# Patient Record
Sex: FEMALE | Race: WHITE
[De-identification: names, ages, dates, MRNs, and addresses within clinical notes are randomized per-mention and may not be internally consistent; named-entity substitution may affect disease eponyms.]

---

## 2017-03-31 ENCOUNTER — HOSPITAL ENCOUNTER (EMERGENCY)
Dept: HOSPITAL 52 - LL.ED | Age: 77
Discharge: HOME | End: 2017-03-31
Payer: MEDICARE

## 2017-03-31 VITALS — DIASTOLIC BLOOD PRESSURE: 64 MMHG | SYSTOLIC BLOOD PRESSURE: 110 MMHG

## 2017-03-31 DIAGNOSIS — J45.909: ICD-10-CM

## 2017-03-31 DIAGNOSIS — Z79.01: ICD-10-CM

## 2017-03-31 DIAGNOSIS — I48.91: ICD-10-CM

## 2017-03-31 DIAGNOSIS — I11.0: ICD-10-CM

## 2017-03-31 DIAGNOSIS — Z79.899: ICD-10-CM

## 2017-03-31 DIAGNOSIS — M19.90: ICD-10-CM

## 2017-03-31 DIAGNOSIS — J44.9: ICD-10-CM

## 2017-03-31 DIAGNOSIS — Z98.890: ICD-10-CM

## 2017-03-31 DIAGNOSIS — E78.00: ICD-10-CM

## 2017-03-31 DIAGNOSIS — Z98.51: ICD-10-CM

## 2017-03-31 DIAGNOSIS — F41.9: ICD-10-CM

## 2017-03-31 DIAGNOSIS — Z88.8: ICD-10-CM

## 2017-03-31 DIAGNOSIS — Z86.73: ICD-10-CM

## 2017-03-31 DIAGNOSIS — R01.1: ICD-10-CM

## 2017-03-31 DIAGNOSIS — I42.9: ICD-10-CM

## 2017-03-31 DIAGNOSIS — S01.411A: Primary | ICD-10-CM
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
## 2017-03-31 DIAGNOSIS — I50.9: ICD-10-CM

## 2017-03-31 NOTE — EDM.PDOC
ED HPI HEAD INJURY





- General


Chief Complaint: Head Injury


Stated Complaint: fall, head injury


Time Seen by Provider: 17 11:56


Source of Information: Reports: Patient


History Limitations: Reports: No limitations





- History of Present Illness


INITIAL COMMENTS - FREE TEXT/NARRATIVE: 





Patient came in for evaluation of skin injuries to right side of head (cheek 

and around eyebrow) after falling over when petting dog.  Dog made her lose 

balance. No LOC. Broke lens of glasses on right side (popped it out of the frame

). Had mild headache initially but that improved. Had mild right shoulder 

discomfort also, improved. Complains of mild abrasions to knees.  Patient is 

concerned about the facial skin injuries and wants to know if she needs 

stitches. Tetanus UTD. No other complaints. No vision changes. No neuro 

changes. No SOB. No neck pain. Denies any other musculoskeletal pain.  No new 

back pain/abdominal pain. 





Says she has somewhat poor balance in general. 





INR yesterday subtheraputic at 1.8


Coumadin dose adjusted by primary provider yesterday. 





- Related Data


Allergies/ADRs: 


 Allergies











Allergy/AdvReac Type Severity Reaction Status Date / Time


 


aripiprazole [From Abilify] Allergy  Hallucinati Verified 16 01:04





   ons  


 


atorvastatin calcium Allergy  Muscle Verified 16 01:04





[From Lipitor]   Weakness  


 


ceftriaxone sodium Allergy  Hives Verified 16 01:04





[From Rocephin]     


 


fluticasone propionate Allergy  Cannot Verified 16 01:04





[From Advair Diskus]   Remember  


 


lansoprazole [From Prevacid] Allergy  Nausea Verified 16 01:04


 


loratadine Allergy  Headache,na Verified 16 01:04





[From Claritin-D 12 Hour]   usea  


 


metoprolol Allergy  Hallucinati Verified 16 01:04





   ons,hyperac  





   tivity  


 


nebivolol [From Bystolic] Allergy  Hypotension Verified 17 11:36


 


omeprazole [From Prilosec] Allergy  Nausea, Verified 16 01:04





   stomach  





   upset  


 


omeprazole magnesium Allergy  Nausea, Verified 16 01:04





[From Prilosec]   stomach  





   upset  


 


paroxetine HCl [From Paxil] Allergy  Stomach Verified 16 01:04





   Upset,  





   nausea,  





   weight gain  


 


pseudoephedrine sulfate Allergy  headache, Verified 16 01:04





[From Claritin-D 12 Hour]   nausea  


 


salmeterol xinafoate Allergy  Cannot Verified 16 01:04





[From Advair Diskus]   Remember  


 


sertraline HCl [From Zoloft] Allergy  suicidial Verified 16 01:04





   thoughts  


 


venlafaxine HCl Allergy  Cannot Verified 16 01:04





[From Effexor]   Remember  











Home Meds: 


 Home Meds





Cholecalciferol (Vitamin D3) [D3-2000] 2,000 unit PO DAILY@1000 14 [

History]


Ubidecarenone [Coq-10] 100 mg PO DAILY@1000 14 [History]


Albuterol [Ventolin HFA] 2 puff INH Q4H PRN 14 [History]


Beclomethasone Dipropionate [Qvar 40 Mcg] 2 puff INH BID 14 [History]


Ipratropium/Albuterol Sulfate [Duoneb 0.5 MG-3 MG/3 ML] 3 ml INH QID PRN  [History]


Polyethylene Glycol 3350 [MiraLAX] 17 gm PO DAILY PRN 14 [History]


Warfarin [Coumadin] 7.5 mg PO DAILY 14 [History]


Vitamin B Complex with C [Super B With Vitamin C] 50 mg PO DAILY 10/12/14 [

History]


Brimonidine [Alphagan P 0.1% Ophth Soln] 1 drop EYEBOTH BID 07/17/15 [History]


Ezetimibe [Zetia] 10 mg PO BEDTIME 11/14/15 [History]


L.acidoph,Paracasei, B.lactis [Probiotic] 1 cap PO DAILY 11/14/15 [History]


Fluticasone Propionate [Flonase] 1 spray NASBOTH DAILY PRN 16 [History]


Citalopram [Celexa] 20 mg PO BEDTIME 16 [History]


Furosemide [Lasix] 20 mg PO DAILY #10 tablet 16 [Rx]


Potassium Chloride [Klor-Con M20] 10 meq PO DAILY 16 [History]


Famotidine [Pepcid] 20 mg PO BID PRN 17 [History]


Isosorbide Mononitrate [Imdur] 30 mg PO DAILY 17 [History]


Nitroglycerin [Nitrostat] 0.4 mg SL ASDIRECTED 17 [History]


Ofloxacin [Floxin 0.3% Otic Soln] 1 drop EYEBOTH BID 17 [History]











Past Medical History


HEENT History: Reports: Allergic rhinitis, Cataract, Glaucoma, Impaired vision, 

Other (see below)


Other HEENT History: Wears glasses, beginning cataract of the left eye


Cardiovascular History: Reports: Afib, Arrhythmia, Blood clots/VTE/DVT, 

Cardiomyopathy, Heart Failure, Heart murmur, High cholesterol, Hypertension, 

Syncope, Other (see below)


Other Cardiovascular History: PACs, PVCs Holter monitor study, mild diffuse 

valvular disease by echocardiogram as below including aortic valve stenosis and 

mitral valve insufficiency by clinical exam, near syncopal episodes likely 

secondary to bradycardia with previous episode on 16, dyslipidemia with 

history of fatty liver, bilateral PEs as below, grade 1 diastolic dysfunction 

by echocardiogram


Respiratory History: Reports: Asthma, Bronchitis, recurrent, COPD, Intubation, 

previous, PE, Pulmonary fibrosis, Other (see below)


Other Respiratory History: Bilateral PEs on 2014, mediastinal 

lymphadenopathy by CT scan with benign right-sided pulmonary granuloma biopsy 

and serial CT scans


Gastrointestinal History: Reports: Chronic constipation, Colon polyp, 

Diverticulosis, Gastritis, GERD, Helicobacter pylori, Hiatal hernia, 

Inflammatory bowel disease, Irritable bowel syndrome, PUD, Other (see below)


Other Gastrointestinal History: Previously treated recurrent H. pylori, large 

fixed hiatal hernia with surgery as below, recurrent colonic polyps with large 

villous adenomatous polyp removed on 3/7/11


Genitourinary History: Reports: Chronic renal insuffiency, Urinary incontinence


Other Genitourinary History: Additional borderline mild renal insufficiency


OB/GYN History: Reports: Pregnancy, Spontaneous 


Other OB/BYN History: SABs between 5 and 7 months gestation in  requiring D&

C, Full term  without complications during pregnancies or deliveries, 

menopause at about age 56


Musculoskeletal History: Reports: Arthritis, Back pain, chronic, Fracture, Neck 

pain, chronic, Osteoarthritis, Osteoporosis, Other (see below)


Other Musculoskeletal History: Multiple vertebral body compression fractures, 

right wrist fracture requiring surgery as below


Neurological History: Reports: Concussion, CVA, Head trauma, Other (see below)


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 

hemiparesis and dysarthria with resolved symptoms


Psychiatric History: Reports: Anxiety, Panic attack


Endocrine/Metabolic History: Reports: Osteoporosis, Other (see below)


Other Endocrine/Metabolic History: Hyperglycemia which is diet controlled


Hematologic History: Reports: None


Immunologic History: Reports: None


Oncologic (Cancer) History: Reports: None


Dermatologic History: Reports: None


Other Dermatologic History: shingles at Left flank area.





- Infectious Disease History


Infectious Disease History: Reports: Chicken pox, Helicobacter pylori, Influenza

, Measles, MRSA, Rubella, Scarlet fever, Shingles, Other (see below)


Other Infectious Disease History: Shingles in the left CVA region in 2015, 

MRSA of the left leg in , recurrent H. pylori infections with 2 previous 

treatments





- Past Surgical History


Head Surgeries/Procedures: Reports: None


HEENT Surgical History: Reports: Adenoidectomy, Eye surgery, Laser surgery, 

Oral surgery, Tonsillectomy, Other (see below)


Other HEENT Surgeries/Procedures: Multiple teeth extractions, [place] teeth 

extraction x4 in her late teenage years, tonsillectomy and adenoidectomy at 

about age 3-5, laser treatment for glaucoma bilaterally in about 


Cardiovascular Surgical History: Reports: None


Respiratory Surgical History: Reports: Lung Biopsies, Other (see below)


Other Respiratory Surgeries/Procedures: Right lung biopsy on 03


GI Surgical History: Reports: Colonoscopy, EGD, Nissen fundoplication, 

Polypectomy, Other (see below)


Other GI Surgeries/Procedures: Last colonoscopy and EGD in 2016 with 

previous polypectomy of villous adenoma on 3/1/12 with previous EGD on 3/7/11, 

Nissen fundoplication on 3/6/11


Female  Surgical History: Reports: D&C, Dilitation & evacuation, Tubal 

ligation, Other (see below)


Other Female  Surgeries/Procedures: D&C secondary to SAB in about , 

bilateral tubal ligation on 75


Endocrine Surgical History: Reports: None


Neurological Surgical History: Reports: None


Musculoskeletal Surgical History: Reports: ORIF, Other (see below)


Other Musculoskeletal Surgeries/Procedures:: ORIF of right wrist fracture in 




Oncologic Surgical History: Reports: None


Dermatological Surgical History: Reports: Skin biopsy





- Past Imaging History


Past Imaging History: Reports: Cardiac echo (3/24/15 with ejection fraction of 

60-65% and only mild diffuse valvular disease as above), CAT scan (CT scan of 

the head on 16, maxillary facial region on 10/1/14, CT of the chest on , 14, 13, and 13, CT of the chest on 14 and 3/11/14), 

DEXA scan (13), HIDA scan (11), Holter monitor (3/31/15), Mammogram ( 

last on 3/18/15), Stress testing (Last Cardiolite stress test in [place] in 

about 2015 by patient history with previous evaluation on 3/14/13), 

Ultrasound (Renal ultrasound on 14 and 14, abdominal ultrasound on 14, left axillary ultrasound on 12), Venous doppler (Venous Doppler study 

of the legs bilaterally on 14)





Social & Family History





- Family History


HEENT: Reports: Cataract, Glaucoma, Macular degeneration, Other (see below)


Other HEENT Family History: Father with cataracts and glaucoma, mother with 

macular degeneration


Cardiac: Reports: Afib, CAD, Heart murmur, High cholesterol, Hypertension, MI, 

Pacemaker, Syncope, Other (see below)


Other Cardiac Family History: Paternal grandfather with fatal MI in his 80s, 

mother with syncope/bradycardia requiring pacemaker and unknown valvular 

disease secondary to rheumatic fever as a child however no vascular surgery, 

hyperlipidemia in father, father with hypertension, mother with severe varicose 

veins


Respiratory: Reports: Asthma, COPD, Sleep apnea, Other (see below)


Other Respiratory Family Hisory: Paternal aunt with fatal asthma at about 20 

months of age, son and daughter with asthma, paternal aunt with fatal COPD at 

age 91, brother with COPD, sons x2 with sleep apnea


GI: Reports: Cholelithiasis, GERD, Pancreatitis, PUD, Other (see below)


Other GI Family History: Paternal grandmother with fatal gallbladder surgery at 

age 55, father with GERD and peptic ulcer disease with fatal pancreatitis of 

unknown etiology at age 62


: Reports: Dialysis, Renal disease/insufficiency, Other (see below)


Other  Family History: Father with dialysis secondary to renal failure from 

his pancreatitis as above


OBGYN: Reports: Recurrent spontaneous , Other (see below)


Other OBGYN Family History: mother with history of SAB x3


Musculoskeletal: Reports: RA, SLE, Other (see below)


Other Musculoskeletal Family History: Sister with rheumatoid arthritis, 

daughter with SLE


Neurological: Reports: Alzheimers disease, CVA, Dementia, Seizure, Other (see 

below)


Other Neurological Family History: Grandson with head injury secondary to 

hockey with secondary seizures at about age 6, several CVAs initially at age 59 

in father, paternal uncle with CVA in his 70s, paternal uncle with Alzheimer's 

disease in his 80s


Psychiatric: Reports: ADD, ADHD, Anxiety, Bipolar, Depression, Schizophrenia, 

Other (see below)


Other Psychiatric Family History: Grandson with ADHD, anxiety depression 

disorder in father, daughter and brother with brother having fatal alcohol 

abuse at age 39, bipolar disorder in nephew, paternal uncle with schizophrenia


Endocrine/Metabolic: Reports: Diabetes, type I, Diabetes, type II, IDDM, Other (

see below)


Other Endocrine/Metabolic Family History: AODM and paternal uncle, daughter 

with type I IDDM, nieces with type I IDDM,


Hematologic: Reports: SLE, Other (see below)


Other Hematologic Family History: Daughter with SLE


Immunologic: Reports: AIDS, HIV, Immunosuppression, SLE, Other (see below)


Other Immunologic Family History: Daughter with SLE, brother with HIV as below


Dermatologic: Reports: None


Oncologic: Reports: Breast, Colon, Hodgkin's lymphoma, Other (see below)


Other Oncologic Family History: Paternal aunt with fatal breast cancer in her 

60s, another paternal aunt with breast cancer at age 83, cousin with breast 

cancer in her 30s fatal in her 40s, paternal aunt with colon cancer in her 50s, 

brother with fatal Hodgkin's disease at age 32 with history of homosexuality 

and HIV, cousin with fatal unknown type of cancer at age 42, brother with fatal 

abdominal cancer/?  Stomach cancer in his 30s





- Tobacco Use


Smoking Status *Q: Never Smoker


Used Tobacco, but Quit: No


Second Hand Smoke Exposure: No





- Caffeine Use


Caffeine Use: Reports: Coffee, Soda


Other Caffeine Use: Occassional Diet cola


Caffeine Use Comment: 4 cups of coffee per day, 2 sodas per day





- Alcohol Use


Days Per Week of Alcohol Use: 0


Number of Drinks Per Day: 1


Total Drinks Per Week: 0





- Recreational Drug Use


Recreational Drug Use: No


Drug Use in Last 12 Months: No





- Living Situation & Occupation


Living situation: Reports:  (1965, 5 children), with family (With 

)


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 

restaurant, worked on an assembly line, and was also a )





ED ROS GENERAL





- Review of Systems


Review Of Systems: ROS reveals no pertinent complaints other than HPI.





ED EXAM, HEAD INJURY





- Physical Exam


Exam: See Below


Exam Limited By: No limitations


General Appearance: alert, WD/WN, no apparent distress


Head: facial abrasions, facial lacerations, facial swelling (right lateral 

eyebrow).  No: scalp lacerations, Vargas's Sign, facial ecchymosis, sinus 

tenderness, raccoon eyes


Nexus Criteria: No: posterior, midline cervical tenderness, evidence of 

intoxication, altered level of consciousness, focal neurological deficit, 

painful distracting injuries


Eyes: bilateral eye: EOMI, PERRL


Ears: normal external exam, normal canal


Nose: normal inspection


Throat/Mouth: Normal inspection, Normal lips, Normal voice, No airway compromise


Neck: non-tender, full range of motion, normal alignment, normal inspection


Respiratory: no respiratory distress


Extremities: normal range of motion, pelvis stable.  No: joint effusion


Neurologic: no motor/sensory deficits, alert, normal mood/affect, oriented x 3


Skin: Normal color, Other (Small abrasions/superficial laceration just above 

and below right lateral eyebrow.  Small laceration right cheek. )





- Jaime Coma Score


Best Eye Response ([place]): (4) open spontaneously


Best Verbal Response ([place]): (5) oriented


Best Motor Response ([place]): (6) obeys commands





ED LACERATION/WOUND & JERICHO PROC





- Laceration/Wound Repair


  ** Right Cheek


Lac/wound length in cm: 1.5


Appearance: subcutaneous, clean


Distal NVT: neuro & vascular intact


Anesthetic type: local


Local anesthesia - Lidocaine (Xylocaine): 1% plain


Local anesthetic volume: 2cc


Skin prep: providone-iodine (betadine)


Exploration/Debridement/Repair: wound explored, in a bloodless field, explored 

to base


Closed with: sutures


Suture size: other (5-0)


# of sutures: 5


Suture type: nylon, interrupted


Drain placement: No


Sterile dressing applied: nurse


Tetanus status addressed: Yes


Complications: No





Course





- Vital Signs


Last Recorded V/S: 


 Last Vital Signs











Temp  36.7 C   17 11:27


 


Pulse  78   17 11:27


 


Resp  16   17 11:27


 


BP  110/64   17 11:27


 


Pulse Ox  94 L  17 11:27














- Orders/Labs/Meds


Meds: 


Medications














Discontinued Medications














Generic Name Dose Route Start Last Admin





  Trade Name Frank  PRN Reason Stop Dose Admin


 


Lidocaine HCl  5 ml  17 12:47  17 13:43





  Xylocaine-Mpf 1%  INJECT  17 12:48  Not Given





  ONETIME ONE   


 


Neomycin/Polymyxin/Bacitracin  Confirm  17 13:12  17 13:43





  Triple Antibiotic Oint  Administered  17 13:13  1 each





  Dose   Administration





  1 each   





  .ROUTE   





  .STK-MED ONE   














- Re-Assessments/Exams


Free Text/Narrative Re-Assessment/Exam: 





17 16:09


Patient has not neuro changes, subtheraputic INR yesterday, and headache 

improving. CT scan of head not warranted at this time. 


Wounds cleansed. Only laceration of right cheek required sutures. 


No evidence of fracture noted. 





Wound care discussed. Extensive precautions given prior to discharge. 


She is to follow up as needed if any signs of infection develop or if other 

problems develop. 





Departure





- Departure


Time of Disposition: 13:15


Disposition: Home, Self-Care 01


Condition: good


Clinical Impression: 


 Hematoma





Fall


Qualifiers:


 Encounter type: initial encounter Qualified Code(s): W19.XXXA - Unspecified 

fall, initial encounter





Cheek laceration


Qualifiers:


 Encounter type: initial encounter Laterality: right Qualified Code(s): 

S01.411A - Laceration without foreign body of right cheek and temporomandibular 

area, initial encounter





Abrasion of brow


Qualifiers:


 Encounter type: initial encounter Qualified Code(s): S00.81XA - Abrasion of 

other part of head, initial encounter





Instructions:  Head Injury, Adult, Easy-to-Read, Facial or Scalp Contusion, Easy

-to-Read, Laceration Care, Adult, Easy-to-Read


Referrals: 


Gerald Arias NP [Primary Care Provider] - 


Forms:  ED Department Discharge


Additional Instructions: 


Stitches need to be taken out in 5 days.





Follow up for recheck if you develop any problems such as infection or 

neurologic changes.

## 2017-07-30 ENCOUNTER — HOSPITAL ENCOUNTER (EMERGENCY)
Dept: HOSPITAL 52 - LL.ED | Age: 77
Discharge: HOME | End: 2017-07-30
Payer: MEDICARE

## 2017-07-30 VITALS — DIASTOLIC BLOOD PRESSURE: 56 MMHG | SYSTOLIC BLOOD PRESSURE: 110 MMHG

## 2017-07-30 DIAGNOSIS — K21.9: ICD-10-CM

## 2017-07-30 DIAGNOSIS — M81.0: ICD-10-CM

## 2017-07-30 DIAGNOSIS — Z86.14: ICD-10-CM

## 2017-07-30 DIAGNOSIS — T43.205A: ICD-10-CM

## 2017-07-30 DIAGNOSIS — R42: Primary | ICD-10-CM

## 2017-07-30 DIAGNOSIS — I48.91: ICD-10-CM

## 2017-07-30 DIAGNOSIS — I13.0: ICD-10-CM

## 2017-07-30 DIAGNOSIS — Z86.711: ICD-10-CM

## 2017-07-30 DIAGNOSIS — N18.9: ICD-10-CM

## 2017-07-30 DIAGNOSIS — J44.9: ICD-10-CM

## 2017-07-30 DIAGNOSIS — E78.5: ICD-10-CM

## 2017-07-30 DIAGNOSIS — I69.359: ICD-10-CM

## 2017-07-30 DIAGNOSIS — Z79.01: ICD-10-CM

## 2017-07-30 DIAGNOSIS — Z88.8: ICD-10-CM

## 2017-07-30 DIAGNOSIS — Z88.6: ICD-10-CM

## 2017-07-30 DIAGNOSIS — I50.9: ICD-10-CM

## 2017-07-30 DIAGNOSIS — F41.9: ICD-10-CM

## 2017-07-30 DIAGNOSIS — Z88.1: ICD-10-CM

## 2017-07-30 DIAGNOSIS — M19.90: ICD-10-CM

## 2017-07-30 DIAGNOSIS — Z98.51: ICD-10-CM

## 2017-07-30 LAB
CHLORIDE SERPL-SCNC: 106 MMOL/L (ref 98–107)
SODIUM SERPL-SCNC: 142 MMOL/L (ref 136–145)

## 2017-07-30 PROCEDURE — 80053 COMPREHEN METABOLIC PANEL: CPT

## 2017-07-30 PROCEDURE — 81001 URINALYSIS AUTO W/SCOPE: CPT

## 2017-07-30 PROCEDURE — 85610 PROTHROMBIN TIME: CPT

## 2017-07-30 PROCEDURE — 85025 COMPLETE CBC W/AUTO DIFF WBC: CPT

## 2017-07-30 PROCEDURE — 99284 EMERGENCY DEPT VISIT MOD MDM: CPT

## 2017-07-30 PROCEDURE — 36415 COLL VENOUS BLD VENIPUNCTURE: CPT

## 2017-07-30 NOTE — EDM.PDOC
ED HPI GENERAL MEDICAL PROBLEM





- General


Chief Complaint: General


Stated Complaint: i have been dizzy, and nausea


Time Seen by Provider: 17 18:16


Source of Information: Reports: Patient


History Limitations: Reports: No Limitations





- History of Present Illness


INITIAL COMMENTS - FREE TEXT/NARRATIVE: 





Patient concerned about increasing dizziness and nausea that has been present 

for about one week. She started a new medication, Wellbutrin, approximately two 

weeks ago. No other medication changes. Has felt like this in past when having 

issues with a medication. Patient has numerous intolerances to multiple 

medications and is sensitive to side effects. Also has had mild headache. No 

other complaints other than feeling that her pulse is a bit more quicker than 

usual when she goes to bed at night. Does not complain of rapid palpitations or 

chest pain. Head rotation does not trigger dizziness. Says it comes in "waves". 

No change with stand/sit/lying down. 





No recent illnesses, injuries. 





- Related Data


 Allergies











Allergy/AdvReac Type Severity Reaction Status Date / Time


 


aripiprazole [From Abilify] Allergy  Hallucinati Verified 16 01:04





   ons  


 


atorvastatin calcium Allergy  Muscle Verified 16 01:04





[From Lipitor]   Weakness  


 


ceftriaxone sodium Allergy  Hives Verified 16 01:04





[From Rocephin]     


 


fluticasone propionate Allergy  Cannot Verified 16 01:04





[From Advair Diskus]   Remember  


 


lansoprazole [From Prevacid] Allergy  Nausea Verified 16 01:04


 


loratadine Allergy  Headache,na Verified 16 01:04





[From Claritin-D 12 Hour]   usea  


 


metoprolol Allergy  Hallucinati Verified 16 01:04





   ons,hyperac  





   tivity  


 


nebivolol [From Bystolic] Allergy  Hypotension Verified 17 11:36


 


omeprazole [From Prilosec] Allergy  Nausea, Verified 16 01:04





   stomach  





   upset  


 


omeprazole magnesium Allergy  Nausea, Verified 16 01:04





[From Prilosec]   stomach  





   upset  


 


paroxetine HCl [From Paxil] Allergy  Stomach Verified 16 01:04





   Upset,  





   nausea,  





   weight gain  


 


pseudoephedrine sulfate Allergy  headache, Verified 16 01:04





[From Claritin-D 12 Hour]   nausea  


 


salmeterol xinafoate Allergy  Cannot Verified 16 01:04





[From Advair Diskus]   Remember  


 


sertraline HCl [From Zoloft] Allergy  suicidial Verified 16 01:04





   thoughts  


 


venlafaxine HCl Allergy  Cannot Verified 16 01:04





[From Effexor]   Remember  











Home Meds: 


 Home Meds





Cholecalciferol (Vitamin D3) [D3-2000] 2,000 unit PO DAILY@1000 14 [

History]


Albuterol [Ventolin HFA] 2 puff INH Q4H PRN 14 [History]


Beclomethasone Dipropionate [Qvar 40 Mcg] 2 puff INH DAILY 14 [History]


Ipratropium/Albuterol Sulfate [Duoneb 0.5 MG-3 MG/3 ML] 3 ml INH QID PRN  [History]


Warfarin [Coumadin] 7.5 mg PO DAILY 14 [History]


Vitamin B Complex with C [Super B With Vitamin C] 50 mg PO DAILY 10/12/14 [

History]


Brimonidine [Alphagan P 0.1% Ophth Soln] 1 drop EYEBOTH BID 07/17/15 [History]


Ezetimibe [Zetia] 10 mg PO BEDTIME 11/14/15 [History]


L.acidoph,Paracasei, B.lactis [Probiotic] 1 cap PO DAILY 11/14/15 [History]


Furosemide [Lasix] 20 mg PO DAILY #10 tablet 16 [Rx]


Potassium Chloride [Klor-Con M20] 10 meq PO DAILY 16 [History]


Isosorbide Mononitrate [Imdur] 15 mg PO DAILY 17 [History]


Nitroglycerin [Nitrostat] 0.4 mg SL ASDIRECTED PRN 17 [History]


Ofloxacin [Floxin 0.3% Otic Soln] 1 drop EYEBOTH BID 17 [History]


Meclizine [Antivert] 25 mg PO TID PRN #20 tablet 17 [Rx]


Ondansetron [Zofran ODT] 4 mg PO Q6H PRN #8 tab.dis 17 [Rx]











Past Medical History


HEENT History: Reports: Allergic Rhinitis, Cataract, Glaucoma, Impaired Vision, 

Other (See Below)


Other HEENT History: Wears glasses, beginning cataract of the left eye


Cardiovascular History: Reports: Afib, Arrhythmia, Blood Clots/VTE/DVT, 

Cardiomyopathy, Heart Failure, Heart Murmur, High Cholesterol, Hypertension, 

Syncope, Other (See Below)


Other Cardiovascular History: PACs, PVCs Holter monitor study, mild diffuse 

valvular disease by echocardiogram as below including aortic valve stenosis and 

mitral valve insufficiency by clinical exam, near syncopal episodes likely 

secondary to bradycardia with previous episode on 16, dyslipidemia with 

history of fatty liver, bilateral PEs as below, grade 1 diastolic dysfunction 

by echocardiogram


Respiratory History: Reports: Asthma, Bronchitis, Recurrent, COPD, Intubation, 

Previous, PE, Pulmonary Fibrosis, Other (See Below)


Other Respiratory History: Bilateral PEs on 2014, mediastinal 

lymphadenopathy by CT scan with benign right-sided pulmonary granuloma biopsy 

and serial CT scans


Gastrointestinal History: Reports: Chronic Constipation, Colon Polyp, 

Diverticulosis, Gastritis, GERD, Helicobacter Pylori, Hiatal Hernia, 

Inflammatory Bowel Disease, Irritable Bowel Syndrome, PUD, Other (See Below)


Other Gastrointestinal History: Previously treated recurrent H. pylori, large 

fixed hiatal hernia with surgery as below, recurrent colonic polyps with large 

villous adenomatous polyp removed on 3/7/11


Genitourinary History: Reports: Chronic Renal Insuffiency, Urinary Incontinence


Other Genitourinary History: Additional borderline mild renal insufficiency


OB/GYN History: Reports: Pregnancy, Spontaneous 


Other OB/BYN History: SABs between 5 and 7 months gestation in  requiring D&

C, Full term  without complications during pregnancies or deliveries, 

menopause at about age 56


Musculoskeletal History: Reports: Arthritis, Back Pain, Chronic, Fracture, Neck 

Pain, Chronic, Osteoarthritis, Osteoporosis, Other (See Below)


Other Musculoskeletal History: Multiple vertebral body compression fractures, 

right wrist fracture requiring surgery as below


Neurological History: Reports: Concussion, CVA, Head Trauma, Other (See Below)


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 

hemiparesis and dysarthria with resolved symptoms


Psychiatric History: Reports: Anxiety, Panic Attack


Endocrine/Metabolic History: Reports: Osteoporosis, Other (See Below)


Other Endocrine/Metabolic History: Hyperglycemia which is diet controlled


Hematologic History: Reports: None


Immunologic History: Reports: None


Oncologic (Cancer) History: Reports: None


Dermatologic History: Reports: None


Other Dermatologic History: shingles at Left flank area.





- Infectious Disease History


Infectious Disease History: Reports: Chicken Pox, Helicobacter Pylori, Influenza

, Measles, MRSA, Rubella, Scarlet Fever, Shingles, Other (See Below)


Other Infectious Disease History: Shingles in the left CVA region in 2015, 

MRSA of the left leg in , recurrent H. pylori infections with 2 previous 

treatments





- Past Surgical History


Head Surgeries/Procedures: Reports: None


HEENT Surgical History: Reports: Adenoidectomy, Eye Surgery, Laser Surgery, 

Oral Surgery, Tonsillectomy, Other (See Below)


Respiratory Surgical History: Reports: Lung Biopsies, Other (See Below)


GI Surgical History: Reports: Colonoscopy, EGD, Nissen Fundoplication, 

Polypectomy, Other (See Below)


Female  Surgical History: Reports: D&C, Dilitation & Evacuation, Tubal 

Ligation, Other (See Below)


Musculoskeletal Surgical History: Reports: ORIF, Other (See Below)


Oncologic Surgical History: Reports: None


Dermatological Surgical History: Reports: Skin Biopsy





- Past Imaging History


Past Imaging History: Reports: Cardiac Echo, CAT Scan, DEXA Scan, HIDA Scan, 

Holter Monitor, Mammogram, Stress Testing, Ultrasound, Venous Doppler





Social & Family History





- Family History


HEENT: Reports: Cataract, Glaucoma, Macular Degeneration, Other (See Below)


Other HEENT Family History: Father with cataracts and glaucoma, mother with 

macular degeneration


Cardiac: Reports: Afib, CAD, Heart Murmur, High Cholesterol, Hypertension, MI, 

Pacemaker, Syncope, Other (See Below)


Other Cardiac Family History: Paternal grandfather with fatal MI in his 80s, 

mother with syncope/bradycardia requiring pacemaker and unknown valvular 

disease secondary to rheumatic fever as a child however no vascular surgery, 

hyperlipidemia in father, father with hypertension, mother with severe varicose 

veins


Respiratory: Reports: Asthma, COPD, Sleep Apnea, Other (See Below)


Other Respiratory Family Hisory: Paternal aunt with fatal asthma at about 20 

months of age, son and daughter with asthma, paternal aunt with fatal COPD at 

age 91, brother with COPD, sons x2 with sleep apnea


GI: Reports: Cholelithiasis, GERD, Pancreatitis, PUD, Other (See Below)


Other GI Family History: Paternal grandmother with fatal gallbladder surgery at 

age 55, father with GERD and peptic ulcer disease with fatal pancreatitis of 

unknown etiology at age 62


: Reports: Dialysis, Renal Disease/Insufficiency, Other (See Below)


Other  Family History: Father with dialysis secondary to renal failure from 

his pancreatitis as above


OBGYN: Reports: Recurrent Spontaneous , Other (See Below)


Other OBGYN Family History: mother with history of SAB x3


Musculoskeletal: Reports: RA, SLE, Other (See Below)


Other Musculoskeletal Family History: Sister with rheumatoid arthritis, 

daughter with SLE


Neurological: Reports: Alzheimers Disease, CVA, Dementia, Seizure, Other (See 

Below)


Other Neurological Family History: Grandson with head injury secondary to 

hockey with secondary seizures at about age 6, several CVAs initially at age 59 

in father, paternal uncle with CVA in his 70s, paternal uncle with Alzheimer's 

disease in his 80s


Psychiatric: Reports: ADD, ADHD, Anxiety, Bipolar, Depression, Schizophrenia, 

Other (See Below)


Other Psychiatric Family History: Grandson with ADHD, anxiety depression 

disorder in father, daughter and brother with brother having fatal alcohol 

abuse at age 39, bipolar disorder in nephew, paternal uncle with schizophrenia


Endocrine/Metabolic: Reports: Diabetes, Type I, Diabetes, type II, IDDM, Other (

See Below)


Other Endocrine/Metabolic Family History: AODM and paternal uncle, daughter 

with type I IDDM, nieces with type I IDDM,


Hematologic: Reports: SLE, Other (See Below)


Other Hematologic Family History: Daughter with SLE


Immunologic: Reports: AIDS, HIV, Immunosuppression, SLE, Other (See Below)


Other Immunologic Family History: Daughter with SLE, brother with HIV as below


Dermatologic: Reports: None


Oncologic: Reports: Breast, Colon, Hodgkin's Lymphoma, Other (See Below)


Other Oncologic Family History: Paternal aunt with fatal breast cancer in her 

60s, another paternal aunt with breast cancer at age 83, cousin with breast 

cancer in her 30s fatal in her 40s, paternal aunt with colon cancer in her 50s, 

brother with fatal Hodgkin's disease at age 32 with history of homosexuality 

and HIV, cousin with fatal unknown type of cancer at age 42, brother with fatal 

abdominal cancer/?  Stomach cancer in his 30s





- Tobacco Use


Smoking Status *Q: Never Smoker


Used Tobacco, but Quit: No


Second Hand Smoke Exposure: No





- Caffeine Use


Caffeine Use: Reports: Coffee, Soda


Other Caffeine Use: Occassional Diet cola


Caffeine Use Comment: 4 cups of coffee per day, 2 sodas per day





- Alcohol Use


Days Per Week of Alcohol Use: 0


Number of Drinks Per Day: 1


Total Drinks Per Week: 0





- Recreational Drug Use


Recreational Drug Use: No


Drug Use in Last 12 Months: No





- Living Situation & Occupation


Living situation: Reports: , with Family


Occupation: Retired





ED ROS GENERAL





- Review of Systems


Review Of Systems: See Below


Constitutional: Reports: No Symptoms


HEENT: Reports: No Symptoms.  Denies: Ear Pain, Sinus Problem, Throat Pain, 

Vertigo, Vision Change


Respiratory: Reports: No Symptoms


Cardiovascular: Reports: Lightheadedness.  Denies: Chest Pain, Dyspnea on 

Exertion, Orthopnea, Palpitations, PND, Syncope


GI/Abdominal: Reports: Nausea.  Denies: Abdominal Pain, Black Stool, Bloody 

Stool, Constipation, Diarrhea, Decreased Appetite, Difficulty Swallowing, 

Vomiting


: Reports: No Symptoms


Musculoskeletal: Reports: No Symptoms


Skin: Reports: No Symptoms


Neurological: Reports: Dizziness, Headache.  Denies: Confusion, Numbness, 

Paresthesia, Pre-Existing Deficit, Seizure, Syncope, Tingling, Tremors, Trouble 

Speaking, Difficulty Walking, Change in Speech, Gait Disturbance


Psychiatric: Reports: No Symptoms


Hematologic/Lymphatic: Reports: No Symptoms





ED EXAM, GENERAL





- Physical Exam


Exam: See Below


Exam Limited By: No Limitations


General Appearance: Alert, WD/WN, No Apparent Distress


Eye Exam: Bilateral Eye: EOMI, PERRL


Ears: Normal External Exam, Normal Canal, Hearing Grossly Normal, Normal TMs


Ear Exam: Bilateral Ear: Other (No nystagmus)


Nose: Normal Inspection


Throat/Mouth: Normal Inspection, Normal Voice, No Airway Compromise


Head: Atraumatic, Normocephalic


Neck: Normal Inspection, Supple, Non-Tender, Full Range of Motion.  No: 

Lymphadenopathy (L), Lymphadenopathy (R)


Respiratory/Chest: No Respiratory Distress, Lungs Clear, Normal Breath Sounds, 

No Accessory Muscle Use, Chest Non-Tender


Cardiovascular: Regular Rate, Rhythm, No Murmur


Peripheral Pulses: 2+: Radial (L), Radial (R)


GI/Abdominal: Normal Bowel Sounds, Soft, Non-Tender


 (Female) Exam: Deferred


Rectal (Female) Exam: Deferred


Back Exam: No: CVA Tenderness (L), CVA Tenderness (R)


Extremities: Non-Tender, Normal Capillary Refill


Neurological: Alert, Oriented, Normal Cognition, Normal Gait, No Motor/Sensory 

Deficits


Psychiatric: Normal Affect, Normal Mood


Skin Exam: Warm, Dry, Intact, Normal Color





Course





- Vital Signs


Last Recorded V/S: 


 Last Vital Signs











Temp  36.7 C   17 18:34


 


Pulse  75   17 19:10


 


Resp  14   17 18:34


 


BP  110/56 L  17 19:10


 


Pulse Ox  96   17 19:10














- Orders/Labs/Meds


Labs: 


 Laboratory Tests











  17 Range/Units





  19:10 19:10 19:10 


 


WBC  9.1    (4.0-10.2)  K/uL


 


RBC  4.23    (3.77-5.09)  M/uL


 


Hgb  13.6    (11.7-15.5)  g/dL


 


Hct  40.7    (34.0-46.0)  %


 


MCV  96.2    (84.0-98.0)  fL


 


MCH  32.2    (28.2-33.3)  pg


 


MCHC  33.4    (31.7-36.0)  g/dL


 


RDW  14.4 H    (11.2-14.1)  %


 


Plt Count  241    (150-350)  K/uL


 


Neut % (Auto)  56.9    (45.0-80.0)  %


 


Lymph % (Auto)  29.0    (10.0-50.0)  %


 


Mono % (Auto)  12.3    (2.0-14.0)  %


 


Eos % (Auto)  1.4    (0.0-5.0)  %


 


Baso % (Auto)  0.4    (0.0-2.0)  %


 


Neut # (Auto)  5.17    (1.40-7.00)  K/uL


 


Lymph # (Auto)  2.64    (0.50-3.50)  K/uL


 


Mono # (Auto)  1.12 H    (0.00-1.00)  K/uL


 


Eos # (Auto)  0.13    (0.00-0.50)  K/uL


 


Baso # (Auto)  0.04    (0.00-0.20)  K/uL


 


PT   21.1 H   (9.8-11.7)  SEC


 


INR   1.9   


 


Sodium    142  (136-145)  mmol/L


 


Potassium    3.9  (3.5-5.1)  mmol/L


 


Chloride    106  ()  mmol/L


 


Carbon Dioxide    29.8  (21.0-32.0)  mmol/L


 


BUN    15  (7-18)  mg/dL


 


Creatinine    1.17  (0.51-1.17)  mg/dL


 


Est Cr Clr Drug Dosing    41.91  mL/min


 


Estimated GFR (MDRD)    45  mL/min


 


Glucose    72 L  ()  mg/dL


 


Calcium    9.0  (8.5-10.1)  mg/dL


 


Total Bilirubin    0.1 L  (0.2-1.0)  mg/dL


 


AST    29  (15-37)  U/L


 


ALT    39  (12-78)  U/L


 


Alkaline Phosphatase    84  ()  IU/L


 


Total Protein    7.5  (6.4-8.2)  g/dL


 


Albumin    3.7  (3.4-5.0)  g/dL


 


Specimen Type     


 


Urine Color     


 


Urine Appearance     


 


Urine pH     (5.0-9.0)  


 


Ur Specific Gravity     (1.005-1.030)  


 


Urine Protein     (NEGATIVE)  mg/dL


 


Urine Glucose (UA)     (NEGATIVE)  mg/dL


 


Urine Ketones     (NEGATIVE)  mg/dL


 


Urine Occult Blood     (NEGATIVE)  


 


Urine Nitrite     (NEGATIVE)  


 


Urine Bilirubin     (NEGATIVE)  


 


Urine Urobilinogen     (0.2-1.0)  E.U./dL


 


Ur Leukocyte Esterase     (NEGATIVE)  


 


Urine RBC     /HPF


 


Urine WBC     /HPF


 


Ur Epithelial Cells     /LPF


 


Urine Bacteria     (NONE TO FEW)  /HPF














  17 Range/Units





  19:17 


 


WBC   (4.0-10.2)  K/uL


 


RBC   (3.77-5.09)  M/uL


 


Hgb   (11.7-15.5)  g/dL


 


Hct   (34.0-46.0)  %


 


MCV   (84.0-98.0)  fL


 


MCH   (28.2-33.3)  pg


 


MCHC   (31.7-36.0)  g/dL


 


RDW   (11.2-14.1)  %


 


Plt Count   (150-350)  K/uL


 


Neut % (Auto)   (45.0-80.0)  %


 


Lymph % (Auto)   (10.0-50.0)  %


 


Mono % (Auto)   (2.0-14.0)  %


 


Eos % (Auto)   (0.0-5.0)  %


 


Baso % (Auto)   (0.0-2.0)  %


 


Neut # (Auto)   (1.40-7.00)  K/uL


 


Lymph # (Auto)   (0.50-3.50)  K/uL


 


Mono # (Auto)   (0.00-1.00)  K/uL


 


Eos # (Auto)   (0.00-0.50)  K/uL


 


Baso # (Auto)   (0.00-0.20)  K/uL


 


PT   (9.8-11.7)  SEC


 


INR   


 


Sodium   (136-145)  mmol/L


 


Potassium   (3.5-5.1)  mmol/L


 


Chloride   ()  mmol/L


 


Carbon Dioxide   (21.0-32.0)  mmol/L


 


BUN   (7-18)  mg/dL


 


Creatinine   (0.51-1.17)  mg/dL


 


Est Cr Clr Drug Dosing   mL/min


 


Estimated GFR (MDRD)   mL/min


 


Glucose   ()  mg/dL


 


Calcium   (8.5-10.1)  mg/dL


 


Total Bilirubin   (0.2-1.0)  mg/dL


 


AST   (15-37)  U/L


 


ALT   (12-78)  U/L


 


Alkaline Phosphatase   ()  IU/L


 


Total Protein   (6.4-8.2)  g/dL


 


Albumin   (3.4-5.0)  g/dL


 


Specimen Type  Urincc  


 


Urine Color  Yellow  


 


Urine Appearance  Clear  


 


Urine pH  7.0  (5.0-9.0)  


 


Ur Specific Gravity  1.010  (1.005-1.030)  


 


Urine Protein  Negative  (NEGATIVE)  mg/dL


 


Urine Glucose (UA)  Negative  (NEGATIVE)  mg/dL


 


Urine Ketones  Negative  (NEGATIVE)  mg/dL


 


Urine Occult Blood  Negative  (NEGATIVE)  


 


Urine Nitrite  Negative  (NEGATIVE)  


 


Urine Bilirubin  Negative  (NEGATIVE)  


 


Urine Urobilinogen  0.2  (0.2-1.0)  E.U./dL


 


Ur Leukocyte Esterase  Trace H  (NEGATIVE)  


 


Urine RBC  0-5  /HPF


 


Urine WBC  0-5  /HPF


 


Ur Epithelial Cells  Occasional  /LPF


 


Urine Bacteria  Occasional  (NONE TO FEW)  /HPF











Meds: 


Medications














Discontinued Medications














Generic Name Dose Route Start Last Admin





  Trade Name Freq  PRN Reason Stop Dose Admin


 


Meclizine HCl  25 mg  17 18:58  17 19:07





  Antivert  PO  17 18:59  25 mg





  ONETIME ONE   Administration


 


Ondansetron HCl  4 mg  17 18:58  17 19:07





  Zofran Odt  PO  17 18:59  4 mg





  ONETIME ONE   Administration














- Re-Assessments/Exams


Free Text/Narrative Re-Assessment/Exam: 





17 20:46


Patient given Meclizine and Zofran. Observed.


CBC, Chem overall unremarkable. INR 1.9





Patient stated that she was feeling much better. 





Patient's complaint very well could be related to Wellbutrin. Dizziness, nausea

, elevated heart rate, and headache are all common side effects of Wellbutrin. 

However cannot completely rule out possible other etiologies. For now, will 

have patient DC Wellbutrin. She has done well in past on 5-HTP supplement. She 

stopped taking it as she could not buy it locally. She will now try to have her 

daughter purchase it for her and have it sent to her here in [place]. In meantime

, Rx for additional Meclizine and Zofran given to patient for PRN use. 





She is to watch for changes and is to follow up as needed if symptoms suddenly 

worsen or do not resolve. We did spend some time discussing possible side 

effects of stopping the Wellbutrin although these should be minimal as she has 

only been using the medication for a short time. 





Departure





- Departure


Time of Disposition: 19:50


Disposition: Home, Self-Care 01


Condition: Good


Clinical Impression: 


Adverse reaction to antidepressant drug


Qualifiers:


 Encounter type: initial encounter Qualified Code(s): T43.205A - Adverse effect 

of unspecified antidepressants, initial encounter








- Discharge Information


Prescriptions: 


Meclizine [Antivert] 25 mg PO TID PRN #20 tablet


 PRN Reason: Dizziness


Ondansetron [Zofran ODT] 4 mg PO Q6H PRN #8 tab.dis


 PRN Reason: Nausea


Referrals: 


PCP,None [Primary Care Provider] - 


Forms:  ED Department Discharge


Additional Instructions: 


Stop Wellbutrin.





Let medication get out of you system and see if your symptoms resolve.


Follow up as needed if you have problems. Follow up with your own provider in 

regards to quitting medication.\


OK to restart 5-HTP when you are able to order it.

## 2018-04-13 ENCOUNTER — HOSPITAL ENCOUNTER (EMERGENCY)
Dept: HOSPITAL 52 - LL.ED | Age: 78
Discharge: HOME | End: 2018-04-13
Payer: MEDICARE

## 2018-04-13 VITALS — SYSTOLIC BLOOD PRESSURE: 112 MMHG | DIASTOLIC BLOOD PRESSURE: 67 MMHG

## 2018-04-13 DIAGNOSIS — Z88.8: ICD-10-CM

## 2018-04-13 DIAGNOSIS — I50.9: ICD-10-CM

## 2018-04-13 DIAGNOSIS — J44.9: ICD-10-CM

## 2018-04-13 DIAGNOSIS — I13.0: ICD-10-CM

## 2018-04-13 DIAGNOSIS — N18.9: ICD-10-CM

## 2018-04-13 DIAGNOSIS — Z79.899: ICD-10-CM

## 2018-04-13 DIAGNOSIS — F41.8: Primary | ICD-10-CM

## 2018-04-13 LAB
CHLORIDE SERPL-SCNC: 106 MMOL/L (ref 98–107)
SODIUM SERPL-SCNC: 141 MMOL/L (ref 136–145)